# Patient Record
Sex: MALE | Race: WHITE | NOT HISPANIC OR LATINO | URBAN - METROPOLITAN AREA
[De-identification: names, ages, dates, MRNs, and addresses within clinical notes are randomized per-mention and may not be internally consistent; named-entity substitution may affect disease eponyms.]

---

## 2017-01-01 ENCOUNTER — OFFICE VISIT - HEALTHEAST (OUTPATIENT)
Dept: INTERNAL MEDICINE | Facility: CLINIC | Age: 82
End: 2017-01-01

## 2017-01-01 ENCOUNTER — COMMUNICATION - HEALTHEAST (OUTPATIENT)
Dept: INTERNAL MEDICINE | Facility: CLINIC | Age: 82
End: 2017-01-01

## 2017-01-01 ENCOUNTER — AMBULATORY - HEALTHEAST (OUTPATIENT)
Dept: INTERNAL MEDICINE | Facility: CLINIC | Age: 82
End: 2017-01-01

## 2017-01-01 DIAGNOSIS — M15.0 PRIMARY OSTEOARTHRITIS INVOLVING MULTIPLE JOINTS: ICD-10-CM

## 2017-01-01 DIAGNOSIS — I10 ESSENTIAL HYPERTENSION: ICD-10-CM

## 2017-01-01 DIAGNOSIS — I10 HTN (HYPERTENSION): ICD-10-CM

## 2017-01-01 DIAGNOSIS — E78.2 MIXED HYPERLIPIDEMIA: ICD-10-CM

## 2017-01-01 RX ORDER — ASPIRIN 325 MG
975 TABLET ORAL 2 TIMES DAILY
Status: SHIPPED | COMMUNITY
Start: 2017-01-01

## 2017-01-01 ASSESSMENT — MIFFLIN-ST. JEOR
SCORE: 1555.36
SCORE: 1573.51

## 2017-02-10 ENCOUNTER — COMMUNICATION - HEALTHEAST (OUTPATIENT)
Dept: INTERNAL MEDICINE | Facility: CLINIC | Age: 82
End: 2017-02-10

## 2017-04-14 ENCOUNTER — COMMUNICATION - HEALTHEAST (OUTPATIENT)
Dept: INTERNAL MEDICINE | Facility: CLINIC | Age: 82
End: 2017-04-14

## 2017-06-22 ENCOUNTER — COMMUNICATION - HEALTHEAST (OUTPATIENT)
Dept: INTERNAL MEDICINE | Facility: CLINIC | Age: 82
End: 2017-06-22

## 2017-06-23 ENCOUNTER — RECORDS - HEALTHEAST (OUTPATIENT)
Dept: ADMINISTRATIVE | Facility: OTHER | Age: 82
End: 2017-06-23

## 2017-07-19 ENCOUNTER — COMMUNICATION - HEALTHEAST (OUTPATIENT)
Dept: INTERNAL MEDICINE | Facility: CLINIC | Age: 82
End: 2017-07-19

## 2018-01-01 ENCOUNTER — COMMUNICATION - HEALTHEAST (OUTPATIENT)
Dept: INTERNAL MEDICINE | Facility: CLINIC | Age: 83
End: 2018-01-01

## 2018-01-01 ENCOUNTER — HOME CARE/HOSPICE - HEALTHEAST (OUTPATIENT)
Dept: HOSPICE | Facility: HOSPICE | Age: 83
End: 2018-01-01

## 2018-01-01 ENCOUNTER — RECORDS - HEALTHEAST (OUTPATIENT)
Dept: ADMINISTRATIVE | Facility: OTHER | Age: 83
End: 2018-01-01

## 2018-01-01 ENCOUNTER — OFFICE VISIT - HEALTHEAST (OUTPATIENT)
Dept: INTERNAL MEDICINE | Facility: CLINIC | Age: 83
End: 2018-01-01

## 2018-01-01 DIAGNOSIS — G47.33 OBSTRUCTIVE SLEEP APNEA SYNDROME: ICD-10-CM

## 2018-01-01 DIAGNOSIS — I25.10 CORONARY ATHEROSCLEROSIS: ICD-10-CM

## 2018-01-01 DIAGNOSIS — R31.9 HEMATURIA: ICD-10-CM

## 2018-01-01 DIAGNOSIS — E78.2 MIXED HYPERLIPIDEMIA: ICD-10-CM

## 2018-01-01 LAB
ALBUMIN UR-MCNC: ABNORMAL MG/DL
APPEARANCE UR: CLEAR
BACTERIA #/AREA URNS HPF: ABNORMAL HPF
BILIRUB UR QL STRIP: NEGATIVE
CHOLEST SERPL-MCNC: 263 MG/DL
COLOR UR AUTO: YELLOW
FASTING STATUS PATIENT QL REPORTED: YES
GLUCOSE UR STRIP-MCNC: NEGATIVE MG/DL
HDLC SERPL-MCNC: 48 MG/DL
HGB UR QL STRIP: NEGATIVE
KETONES UR STRIP-MCNC: NEGATIVE MG/DL
LDLC SERPL CALC-MCNC: 179 MG/DL
LEUKOCYTE ESTERASE UR QL STRIP: NEGATIVE
MUCOUS THREADS #/AREA URNS LPF: ABNORMAL LPF
NITRATE UR QL: NEGATIVE
PH UR STRIP: 7 [PH] (ref 5–8)
RBC #/AREA URNS AUTO: ABNORMAL HPF
SP GR UR STRIP: 1.02 (ref 1–1.03)
SQUAMOUS #/AREA URNS AUTO: ABNORMAL LPF
TRIGL SERPL-MCNC: 179 MG/DL
UROBILINOGEN UR STRIP-ACNC: ABNORMAL
WBC #/AREA URNS AUTO: ABNORMAL HPF

## 2018-01-01 RX ORDER — ATORVASTATIN CALCIUM 80 MG/1
TABLET, FILM COATED ORAL
Qty: 90 TABLET | Refills: 3 | Status: SHIPPED | OUTPATIENT
Start: 2018-01-01

## 2018-01-01 ASSESSMENT — MIFFLIN-ST. JEOR: SCORE: 1596.19

## 2018-08-01 ENCOUNTER — HOME CARE/HOSPICE - HEALTHEAST (OUTPATIENT)
Dept: HOSPICE | Facility: HOSPICE | Age: 83
End: 2018-08-01

## 2021-05-25 ENCOUNTER — RECORDS - HEALTHEAST (OUTPATIENT)
Dept: ADMINISTRATIVE | Facility: CLINIC | Age: 86
End: 2021-05-25

## 2021-05-27 ENCOUNTER — RECORDS - HEALTHEAST (OUTPATIENT)
Dept: ADMINISTRATIVE | Facility: CLINIC | Age: 86
End: 2021-05-27

## 2021-05-29 ENCOUNTER — RECORDS - HEALTHEAST (OUTPATIENT)
Dept: ADMINISTRATIVE | Facility: CLINIC | Age: 86
End: 2021-05-29

## 2021-05-30 ENCOUNTER — RECORDS - HEALTHEAST (OUTPATIENT)
Dept: ADMINISTRATIVE | Facility: CLINIC | Age: 86
End: 2021-05-30

## 2021-05-31 ENCOUNTER — RECORDS - HEALTHEAST (OUTPATIENT)
Dept: ADMINISTRATIVE | Facility: CLINIC | Age: 86
End: 2021-05-31

## 2021-05-31 VITALS — HEIGHT: 65 IN | WEIGHT: 212 LBS | BODY MASS INDEX: 35.32 KG/M2

## 2021-05-31 VITALS — WEIGHT: 208 LBS | HEIGHT: 65 IN | BODY MASS INDEX: 34.66 KG/M2

## 2021-05-31 VITALS — HEIGHT: 65 IN | BODY MASS INDEX: 36.15 KG/M2 | WEIGHT: 217 LBS

## 2021-06-01 ENCOUNTER — RECORDS - HEALTHEAST (OUTPATIENT)
Dept: ADMINISTRATIVE | Facility: CLINIC | Age: 86
End: 2021-06-01

## 2021-06-12 NOTE — PROGRESS NOTES
Office Visit - Follow Up   Sabino Salgado   81 y.o. male    Date of Visit: 8/8/2017    Chief Complaint   Patient presents with     Arthritis     Arthritis in fingers have really been acting up        Assessment and Plan   1. HTN (hypertension)  Blood pressure under good control at 120/76  - HM2(CBC w/o Differential)  - Comprehensive Metabolic Panel    2. Primary osteoarthritis involving multiple joints  Hands have been bothering him quite a bit.  He is decided to take aspirin 325 mg he has been taking about 9 pills per day.  He could increase this to a max of 16 pills per day.  He will watch for any GI upset.  He thinks aspirin works better than NSAIDs at this time.  I told him he could see a hand surgeon for injections of particular joints that are acutely swollen in the future.  He will call if he would like to get an appointment.  Hips also are a problem as described below but he is not ready to do any interventions with his hips at this time          No Follow-up on file.     History of Present Illness   This 81 y.o. old comes in complaining that his hand arthritis is continued to be a problem.  His fingers are aching a lot.  Occasionally he has been getting some acutely swollen joints that come and go.  Tried NSAIDs and he thinks now aspirin is working better.  We discussed aspirin dosing for arthritis as listed above.  He plans on taking somewhere between 9 pills a day based on her discussion today  He also complains of his hips bilaterally to cause him some discomfort.  He has limited range of motion as listed below.  Despite this he is able to go for walks and ambulate as much as he desires.  He is not interested in orthopedic consultation for his hips at this time    Review of Systems: A comprehensive review of systems was negative except as noted.     Medications, Allergies and Problem List   Reviewed and updated     Physical Exam   General Appearance:       /76 (Patient Site: Left Arm, Patient  "Position: Sitting)  Pulse 78  Ht 5' 5\" (1.651 m)  Wt 208 lb (94.3 kg)  SpO2 96%  BMI 34.61 kg/m2    Right hip has 30  external rotation left hip has 45  external rotation.  He can just barely cross his legs with his right leg.  Heart rhythm is regular he definitely has occasional to somewhat frequent extrasystoles.  This is a chronic condition.  No change.  Pressure is excellent.  His hands show diffuse osteoarthritic changes in his PIPs, DIPs, and somewhat in his MCPs.  No acute redness or synovitis is noted.     Additional Information   Current Outpatient Prescriptions   Medication Sig Dispense Refill     aspirin 325 MG tablet Take 975 mg by mouth 2 (two) times a day.       aspirin 81 MG EC tablet Take 81 mg by mouth daily.       atorvastatin (LIPITOR) 80 MG tablet TAKE 1 TABLET BY MOUTH EVERY DAY 90 tablet 0     hydrochlorothiazide (HYDRODIURIL) 50 MG tablet TAKE 1/2 TABLET(25 MG) BY MOUTH DAILY 30 tablet 11     hydrocortisone 2.5 % cream Apply twice daily to affected skin 30 g 3     ketoconazole (NIZORAL) 2 % cream Apply once daily to affected skin 60 g 6     multivitamin therapeutic (THERAGRAN) tablet Take 1 tablet by mouth daily.       potassium chloride SA (K-DUR,KLOR-CON) 10 MEQ tablet Take 1 tablet (10 mEq total) by mouth 2 (two) times a day. 180 tablet 3     tamsulosin (FLOMAX) 0.4 mg Cp24 Take 1 capsule by mouth daily.  10     tolterodine (DETROL LA) 2 MG ER capsule Take 1 capsule by mouth daily.  10     codeine-guaiFENesin (GUAIFENESIN AC)  mg/5 mL liquid Take 5 mL by mouth 3 (three) times a day as needed for cough. 120 mL 1     No current facility-administered medications for this visit.      No Known Allergies  Social History   Substance Use Topics     Smoking status: Never Smoker     Smokeless tobacco: Never Used     Alcohol use Yes       Review and/or order of clinical lab tests:  Review and/or order of radiology tests:  Review and/or order of medicine tests:  Discussion of test results " with performing physician:  Decision to obtain old records and/or obtain history from someone other than the patient:  Review and summarization of old records and/or obtaining history from someone other than the patient and.or discussion of case with another health care provider:  Independent visualization of image, tracing or specimen itself:    Time: total time spent with the patient was 25 minutes of which >50% was spent in counseling and coordination of care     Luc Swain MD

## 2021-06-14 NOTE — PROGRESS NOTES
Office Visit - Follow Up   Sabino Salgado   81 y.o. male    Date of Visit: 11/22/2017    Chief Complaint   Patient presents with     Follow-up     Has a lot of aches and pains, hands, right shoulder, right knee, wondering if from statin.  Has been off x 1 week and has noticed improvement - would like alternative rx        Assessment and Plan   1. Mixed hyperlipidemia  He has been off his 80 mg of atorvastatin for the past week or 2.  He thinks it is making a difference as he feels less myalgias and arthralgias.  I told him he could try being off this medication for the next month or so.  And then restart his atorvastatin at half a tablet per day.  That would be 40 mg per day.  With his marked hyper lipidemia and underlying coronary disease we should try and get him on some ongoing statin therapy.          No Follow-up on file.     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  His friend had some aching muscles and he stopped his statin therapy and they got better.  L decided to stop his atorvastatin 80 mg per day and he thinks maybe it is improving somewhat with less aching in his muscles of his legs and a bit less aching in his joints.  He had a good discussion today about myositis/myalgias with statin therapy.  I told him that this is primarily dose related and he is on a maximum dose of Lipitor.  His cholesterol numbers before he started Lipitor many years ago were over 300.  He does have previous history of coronary angioplasty with stent placement back in 1992.  He reports no angina.  He was recently diagnosed with adult sleep apnea and tried CPAP for quite some time never thought it helped much and he stopped this over the last month.    Review of Systems: A comprehensive review of systems was negative except as noted.     Medications, Allergies and Problem List   Reviewed and updated     Physical Exam   General Appearance:       BP  "110/66 (Patient Site: Right Arm, Patient Position: Sitting)  Pulse 80  Ht 5' 5\" (1.651 m)  Wt 212 lb (96.2 kg)  SpO2 96%  BMI 35.28 kg/m2    His muscles in his arms and his legs are nontender to palpation.  He has good range of motion about his hips and knees.  No effusions are present.     Additional Information   Current Outpatient Prescriptions   Medication Sig Dispense Refill     aspirin 325 MG tablet Take 975 mg by mouth 2 (two) times a day.       aspirin 81 MG EC tablet Take 81 mg by mouth daily.       codeine-guaiFENesin (GUAIFENESIN AC)  mg/5 mL liquid Take 5 mL by mouth 3 (three) times a day as needed for cough. 120 mL 1     hydroCHLOROthiazide (HYDRODIURIL) 50 MG tablet TAKE 1/2 TABLET(25 MG) BY MOUTH DAILY 90 tablet 3     hydrocortisone 2.5 % cream Apply twice daily to affected skin 30 g 3     ketoconazole (NIZORAL) 2 % cream Apply once daily to affected skin 60 g 6     multivitamin therapeutic (THERAGRAN) tablet Take 1 tablet by mouth daily.       potassium chloride SA (K-DUR,KLOR-CON) 10 MEQ tablet Take 1 tablet (10 mEq total) by mouth 2 (two) times a day. 180 tablet 3     tamsulosin (FLOMAX) 0.4 mg Cp24 Take 1 capsule by mouth daily.  10     tolterodine (DETROL LA) 2 MG ER capsule Take 1 capsule by mouth daily.  10     atorvastatin (LIPITOR) 80 MG tablet TAKE 1 TABLET BY MOUTH EVERY DAY 90 tablet 0     No current facility-administered medications for this visit.      No Known Allergies  Social History   Substance Use Topics     Smoking status: Never Smoker     Smokeless tobacco: Never Used     Alcohol use Yes       Review and/or order of clinical lab tests:  Review and/or order of radiology tests:  Review and/or order of medicine tests:  Discussion of test results with performing physician:  Decision to obtain old records and/or obtain history from someone other than the patient:  Review and summarization of old records and/or obtaining history from someone other than the patient and.or " discussion of case with another health care provider:  Independent visualization of image, tracing or specimen itself:    Time: total time spent with the patient was 15 minutes of which >50% was spent in counseling and coordination of care     Luc Swain MD

## 2021-06-15 NOTE — PROGRESS NOTES
Office Visit - Follow Up   Sabino Salgado   82 y.o. male    Date of Visit: 1/17/2018    Chief Complaint   Patient presents with     Follow-up     Fasting for lab work - Cholesterol check (has been off Lipitor x 2 months) - Recently had cortisone inject inright knee 1 week ago (feels good) - Ran out of HCTZ and K+ and hasnt had refilled        Assessment and Plan   1. Mixed hyperlipidemia  I suggested that he try a reduced dose of atorvastatin at 40 mg per day.  With his known coronary disease I think he should be on some type of statin therapy.  We had a good discussion about this.  - Lipid Profile    2. Hematuria  He has had some urinary frequency and what he thought was a bit of blood in the urine will check urinalysis today.  - Urinalysis Macroscopic  - Urine,Microscopic    3. Obstructive sleep apnea syndrome  Uses CPAP at night.    4. Coronary atherosclerosis  Previous history of coronary angioplasty with stent placement in 1992 no current angina.      The following high BMI interventions were performed this visit: encouragement to exercise and weight monitoring    No Follow-up on file.     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  He thought he wanted to take a break from this medication.  He seems to be better off this medication has less joint pains.  We discussed this.  In addition he has been off HCTZ for the past 1 month.  Despite that his blood pressure is adequate today.  Will stay off this medication now.  He is also been not taking any aspirin.  I suggested that he take at least one aspirin daily.  He complains of some wax in his ears.  I suggested that he use some Debrox for now.  He complains of some dark discoloration in his urine he wonders if there is some blood in there.  No significant urinary frequency.  No burning with urination.    Review of Systems: A comprehensive review of systems was negative except as  "noted.     Medications, Allergies and Problem List   Reviewed and updated     Physical Exam   General Appearance:       /76 (Patient Site: Right Arm, Patient Position: Sitting, Cuff Size: Adult Large)  Pulse 74  Ht 5' 5\" (1.651 m)  Wt 217 lb (98.4 kg)  SpO2 97%  BMI 36.11 kg/m2    He has some mild to modest amount of wax in both of his ears but no obstruction.  His heart rhythm is regular without murmurs or gallops.  Blood pressure is good.  No edema in his legs.  Weight is stable but too high.     Additional Information   Current Outpatient Prescriptions   Medication Sig Dispense Refill     aspirin 325 MG tablet Take 975 mg by mouth 2 (two) times a day.       tamsulosin (FLOMAX) 0.4 mg Cp24 Take 1 capsule by mouth daily.  10     tolterodine (DETROL LA) 2 MG ER capsule Take 1 capsule by mouth daily.  10     multivitamin therapeutic (THERAGRAN) tablet Take 1 tablet by mouth daily.       No current facility-administered medications for this visit.      No Known Allergies  Social History   Substance Use Topics     Smoking status: Never Smoker     Smokeless tobacco: Never Used     Alcohol use Yes       Review and/or order of clinical lab tests:  Review and/or order of radiology tests:  Review and/or order of medicine tests:  Discussion of test results with performing physician:  Decision to obtain old records and/or obtain history from someone other than the patient:  Review and summarization of old records and/or obtaining history from someone other than the patient and.or discussion of case with another health care provider:  Independent visualization of image, tracing or specimen itself:    Time: total time spent with the patient was 25 minutes of which >50% was spent in counseling and coordination of care     Luc Swain MD    "